# Patient Record
(demographics unavailable — no encounter records)

---

## 2025-01-28 NOTE — REASON FOR VISIT
[Follow-Up: _____] : a [unfilled] follow-up visit [Language Line ] : provided by Language Line   [Time Spent: ____ minutes] : Total time spent using  services: [unfilled] minutes. The patient's primary language is not English thus required  services. [FreeTextEntry1] : +STACY [Interpreters_IDNumber] : [id number] [Interpreters_FullName] : Duncan [TWNoteComboBox1] : [place]

## 2025-01-28 NOTE — HISTORY OF PRESENT ILLNESS
[___ Month(s) Ago] : [unfilled] month(s) ago [FreeTextEntry1] : 1/2025 followup: R knee pain, no swelling, occasionally, when she walks, clicking noise. No L. knee pain.  L. wrist pain also, no swelling. Unsure if she has stiffness. Symptoms worse in the cold weather and tends to get worse in AM. Takes tylenol PRN which helps a little.  Some rashes on forearms and back, itchy, ongoing; no health insurance so has not been able to follow up with dermatology.  No oral or nasal ulcers. No hair loss. No weight loss. No hematuria. No dry eyes or dry mouth. No Raynauds.

## 2025-01-28 NOTE — ASSESSMENT
[FreeTextEntry1] : 43F here for followup, to discuss joint pains. She was initially referred for +STACY 1:160 in 4/2024 as part of infertility workup by gynecologist. Currently she is having R. knee and L. wrist pain, without any swelling.  Otherwise, denies malar rash, oral/nasal ulcers, sicca symptoms, weight loss, hematuria, Raynauds, hx of DVT/PE or miscarriage- making suspicion for rheumatologic process low.  Her only concern is itchy rashes on back, had been given diagnosis of prurigo nodularis by dermatology- but these rashes are not noted to be associated with rheumatologic conditions. She was advised to follow up with dermatology for this condition.  In the meantime for +STACY and knee/wrist evaluation will check tests for SLE, Sjogrens, RA.  She was also advised that  +STACY does not necessarily mean she will have an autoimmune condition. WIll also check Xrays of R. knee and L. hand/wrist due to pain in those joints.   If tests are negative, she was advised to follow up in 1 year for reevaluation of +STACY.

## 2025-01-28 NOTE — DATA REVIEWED
[FreeTextEntry1] : STACY 1:160 homogeneous in 4/2024 dsDNA, RAQUEL, Sjogrens, scleroderma, centromere, C3, 4, APLS antibodies, ANCA all negative/normal Do Not Resuscitate (DNR)

## 2025-01-28 NOTE — REVIEW OF SYSTEMS
[Arthralgias] : arthralgias [Joint Pain] : joint pain [Joint Swelling] : no joint swelling [As Noted in HPI] : as noted in HPI [Skin Lesions] : skin lesion [Negative] : Heme/Lymph

## 2025-01-28 NOTE — PHYSICAL EXAM
[General Appearance - Alert] : alert [General Appearance - In No Acute Distress] : in no acute distress [General Appearance - Well Developed] : well developed [General Appearance - Well-Appearing] : healthy appearing [Sclera] : the sclera and conjunctiva were normal [Extraocular Movements] : extraocular movements were intact [Outer Ear] : the ears and nose were normal in appearance [Neck Appearance] : the appearance of the neck was normal [] : no respiratory distress [Exaggerated Use Of Accessory Muscles For Inspiration] : no accessory muscle use [Edema] : there was no peripheral edema [Musculoskeletal - Swelling] : no joint swelling seen [FreeTextEntry1] : mild tenderness on palpation of R. knee joint but no crepitus or effusion; no tenderness to L. wrist with normal ROM and negative finkelsteins test.  [No Focal Deficits] : no focal deficits [Oriented To Time, Place, And Person] : oriented to person, place, and time [Affect] : the affect was normal [Mood] : the mood was normal

## 2025-05-01 NOTE — PLAN
[FreeTextEntry1] : 44yo G0, LMP 4/22- who presents for consult regarding known fibroid uterus, desiring fertility preserving myomectomy. Patient without any significant AUB or bulk symptoms. Patient affirmed her desire for myomectomy. Reviewed the significant surgical risks given her prior surgery including adhesions, hemorrhage, and damage to nearby structures. Patient understands that surgery will not guarantee future fertility. Additionally, patient understands that in the event of hemorrhage and to save her life, hysterectomy may be indicated. Patient verbalized understanding. All questions were answered to the patient's apparent satisfaction   #Fibroid uterus - Will book for open myomectomy. Patient understanding of midline vertical incision  Carloz Vila, PGY-3 Obstetrics and Gynecology  seen and evaluated w/ Dr. ISABELA Dudley and LA KARIMI Fellow Addendum:  41 yo G0 presenting for consult due to bulk symptoms from known fibroids. She desires future fertility but does not have a current partner or plan for ART. PMSH significant for 3 prior abdominal myomectomies requiring blood transfusions and significant adhesions noted (2 trvs incision 1 midline vertical), partial R vulvectomy, prurigo nodularis. MRI showing "uterus measuring 8.6 x 16.1 x 19.4cm. Multiple myomas replacing most of the uterus, largest measuring 6.8 x 7.1 x 9cm in the left body." On exam, patient with bulky 22w size uterus and moderate mobility. Management options reviewed. Recommended lupron vs. hysterectomy. Patient only interected in myomectomy at this time. Dicussed r/b/a and increased risk of need for transfusion and possible injury to bladder/bowel. Understands and consents to possible need for hysterectomy if necessary for life-saving measures. Plan for ex-lap via vertical midline incision with abdominal myomectomy.  Ana Dudley MD PGY-6, [facility]

## 2025-05-01 NOTE — HISTORY OF PRESENT ILLNESS
[FreeTextEntry1] : Belgian Speaking #[id number] (Carlee), [place]  41yo G0, LMP 4/22- who presents for consult regarding known fibroid uterus. Patient describ ball-like pain intermittently and it was noticed on exam. She says she has been diagnosed with fibroids for "a long time." During discussion with the patient, indication for prior myomectomies are unclear.   Denies any painful menses or heavy menses. Denies any issues with urinating or voiding. Says she will have left-sided back pain intermittently and sometimes with her menses. Patient had previously been trying to conceive but broke up with her partner after being unable to conceive for x3 years. Patient does not want a hysterectomy and wants to be able to conceive in the future. Does not have a partner currently   MRI Pelvis 2/18/2025 Ut: Anteverted measuring 8.6 x 16.1 x 19.4cm. Multiple myomas replacing most of the uterus, largest measuring 6.8x7.1x9 in the left body RO: 3.2 x 2.4 x 2.8cm and w/ a 2.2cm dominant follicle LO: No visualized Adnexa: wnl   Upon review of records, patient was seen on 4/10/2024 by Dr. DREW Briones. Uterus at that time was noted to be 16wks in size on sonogram and patient desired to preserve fertility. At the time, patient was recommended to but had not seen [facility]. Patient was started on Lupron. Was advised not to proceed with another myomectomy  Upon asking, patient was never started on Lupron  POBHx: G0 PGynHx: Fibroid uterus s/p abdominal myomectomy x3 (x2 transverse and x1 midline vertical. States she had transfusions with all of her surgeries  -(2017): Low transverse incision. Uterus 18wk size. Myomectomy removing 40 fibroids. Noted to be subserosal with large intramural (8cm largest). 3cm intramural also noted to be inferior to the level of the bladder flap. EBL 300cc. No cavity entry noted  -(2020): Low transverse incision. Cherney incision to enter the abdomen. Uterus 20-22wks in size. Myomectomy removing 26 fibroids ->Op note with significant adhesions of the left adnexa and the tube was noted to be distorted. Right tube additionally noted to be distorted. Intraop findings also concerning for underlying adenomyosis. -1000cc. Received 1U of pRBCs ->Bladder also noted to be high -(2021): Midline vertical. Uterus 22wk size. Myomectomy w/ ureteral stent placement. Large anterior lower uterine segment fibroid, ~15cm.  ->Op note with bilateral fallopian tubes irregular appearing and adherent to the uterus. Cavity entered during dissection of large DIANNA fibroid ~15-20cm. There was a large defect with significant bleeding from the uterine artery. EBL 1200cc. Patient received a total of4U of pRBCs. Decision made intra-op to conclude procedure to the difficulty with the dissection and hemostasis of the above   Partial right vulvectomy for chronic vulvar ulceration w/ benign path by Dr. LA Hayes (2023)  Pap PAGE and HPV neg (3/2024) MedHx: Prurigo Nodularis SurgHx: Above  Meds: Denies taking any  All: NKDA. Pork allergy Soc: Denies t/e/d Works at [employer]

## 2025-05-01 NOTE — HISTORY OF PRESENT ILLNESS
[FreeTextEntry1] : Slovenian Speaking #[id number] (Carlee), [place]ian  43yo G0, LMP 4/22- who presents for consult regarding known fibroid uterus. Patient describ ball-like pain intermittently and it was noticed on exam. She says she has been diagnosed with fibroids for "a long time." During discussion with the patient, indication for prior myomectomies are unclear.   Denies any painful menses or heavy menses. Denies any issues with urinating or voiding. Says she will have left-sided back pain intermittently and sometimes with her menses. Patient had previously been trying to conceive but broke up with her partner after being unable to conceive for x3 years. Patient does not want a hysterectomy and wants to be able to conceive in the future. Does not have a partner currently   MRI Pelvis 2/18/2025 Ut: Anteverted measuring 8.6 x 16.1 x 19.4cm. Multiple myomas replacing most of the uterus, largest measuring 6.8x7.1x9 in the left body RO: 3.2 x 2.4 x 2.8cm and w/ a 2.2cm dominant follicle LO: No visualized Adnexa: wnl   Upon review of records, patient was seen on 4/10/2024 by Dr. DREW Briones. Uterus at that time was noted to be 16wks in size on sonogram and patient desired to preserve fertility. At the time, patient was recommended to but had not seen [facility]. Patient was started on Lupron. Was advised not to proceed with another myomectomy  Upon asking, patient was never started on Lupron  POBHx: G0 PGynHx: Fibroid uterus s/p abdominal myomectomy x3 (x2 transverse and x1 midline vertical. States she had transfusions with all of her surgeries  -(2017): Low transverse incision. Uterus 18wk size. Myomectomy removing 40 fibroids. Noted to be subserosal with large intramural (8cm largest). 3cm intramural also noted to be inferior to the level of the bladder flap. EBL 300cc. No cavity entry noted  -(2020): Low transverse incision. Cherney incision to enter the abdomen. Uterus 20-22wks in size. Myomectomy removing 26 fibroids ->Op note with significant adhesions of the left adnexa and the tube was noted to be distorted. Right tube additionally noted to be distorted. Intraop findings also concerning for underlying adenomyosis. -1000cc. Received 1U of pRBCs ->Bladder also noted to be high -(2021): Midline vertical. Uterus 22wk size. Myomectomy w/ ureteral stent placement. Large anterior lower uterine segment fibroid, ~15cm.  ->Op note with bilateral fallopian tubes irregular appearing and adherent to the uterus. Cavity entered during dissection of large DIANNA fibroid ~15-20cm. There was a large defect with significant bleeding from the uterine artery. EBL 1200cc. Patient received a total of4U of pRBCs. Decision made intra-op to conclude procedure to the difficulty with the dissection and hemostasis of the above   Partial right vulvectomy for chronic vulvar ulceration w/ benign path by Dr. LA Hayes (2023)  Pap PAGE and HPV neg (3/2024) MedHx: Prurigo Nodularis SurgHx: Above  Meds: Denies taking any  All: NKDA. Pork allergy Soc: Denies t/e/d Works at [employer]

## 2025-05-01 NOTE — END OF VISIT
[] : Resident [FreeTextEntry3] : Pt seen at bedside, spoken to via  phone. Desires repeat myomectomy. Understands risks of procedure including but not limited to VTE, SSI, damage to bowel bladder ureter thermal damage, need for midline vertical. Understands would need c/s when preg.Understands this doesnt guarantee pregnancy. Rec BERLIN, pt without a partner currently. Discussed risks of repeat myomectomy, understands increased risk of PRBC transfusion, damage to nearby organs/nerves. Discussed recovery, time off work. Discussed that fibroids likely will reocurr. Reviewed risk of malignancy. Understands risk of hysterectomy if life threatening bleeding. Pt aware that this would limit all child bearing in future All questions answered, pt states she understands risks.   Jyoti Catalan MD

## 2025-05-01 NOTE — PLAN
[FreeTextEntry1] : 42yo G0, LMP 4/22- who presents for consult regarding known fibroid uterus, desiring fertility preserving myomectomy. Patient without any significant AUB or bulk symptoms. Patient affirmed her desire for myomectomy. Reviewed the significant surgical risks given her prior surgery including adhesions, hemorrhage, and damage to nearby structures. Patient understands that surgery will not guarantee future fertility. Additionally, patient understands that in the event of hemorrhage and to save her life, hysterectomy may be indicated. Patient verbalized understanding. All questions were answered to the patient's apparent satisfaction   #Fibroid uterus - Will book for open myomectomy. Patient understanding of midline vertical incision  Carloz Vila, PGY-3 Obstetrics and Gynecology  seen and evaluated w/ Dr. ISABELA Dudley and LA KARIMI Fellow Addendum:  41 yo G0 presenting for consult due to bulk symptoms from known fibroids. She desires future fertility but does not have a current partner or plan for ART. PMSH significant for 3 prior abdominal myomectomies requiring blood transfusions and significant adhesions noted (2 trvs incision 1 midline vertical), partial R vulvectomy, prurigo nodularis. MRI showing "uterus measuring 8.6 x 16.1 x 19.4cm. Multiple myomas replacing most of the uterus, largest measuring 6.8 x 7.1 x 9cm in the left body." On exam, patient with bulky 22w size uterus and moderate mobility. Management options reviewed. Recommended lupron vs. hysterectomy. Patient only interected in myomectomy at this time. Dicussed r/b/a and increased risk of need for transfusion and possible injury to bladder/bowel. Understands and consents to possible need for hysterectomy if necessary for life-saving measures. Plan for ex-lap via vertical midline incision with abdominal myomectomy.  Ana Dudley MD PGY-6, [facility]

## 2025-05-01 NOTE — PHYSICAL EXAM
[Appropriately responsive] : appropriately responsive [Alert] : alert [No Acute Distress] : no acute distress [Soft] : soft [Non-tender] : non-tender [Normal] : normal external genitalia [FreeTextEntry7] : Low transverse scar, healed. Midline vertical scar healed to the level of the umbilicus  [FreeTextEntry6] : Bulky and wide uterus, ~22wks in size. Moderately mobile.